# Patient Record
Sex: FEMALE | Employment: UNEMPLOYED | URBAN - METROPOLITAN AREA
[De-identification: names, ages, dates, MRNs, and addresses within clinical notes are randomized per-mention and may not be internally consistent; named-entity substitution may affect disease eponyms.]

---

## 2017-05-11 ENCOUNTER — HOSPITAL ENCOUNTER (EMERGENCY)
Facility: CLINIC | Age: 37
Discharge: HOME | End: 2017-05-11
Attending: EMERGENCY MEDICINE | Admitting: EMERGENCY MEDICINE

## 2017-05-11 VITALS
OXYGEN SATURATION: 99 % | TEMPERATURE: 99.3 F | DIASTOLIC BLOOD PRESSURE: 65 MMHG | SYSTOLIC BLOOD PRESSURE: 111 MMHG | RESPIRATION RATE: 16 BRPM | HEART RATE: 80 BPM | WEIGHT: 207.23 LBS

## 2017-05-11 DIAGNOSIS — R20.9 ALTERATIONS OF SENSATIONS: ICD-10-CM

## 2017-05-11 LAB
ALBUMIN UR-MCNC: NEGATIVE MG/DL
ANION GAP SERPL CALCULATED.3IONS-SCNC: 5 MMOL/L (ref 3–14)
APPEARANCE UR: CLEAR
BASOPHILS # BLD AUTO: 0 10E9/L (ref 0–0.2)
BASOPHILS NFR BLD AUTO: 0.5 %
BILIRUB UR QL STRIP: NEGATIVE
BUN SERPL-MCNC: 13 MG/DL (ref 7–30)
CALCIUM SERPL-MCNC: 8.9 MG/DL (ref 8.5–10.1)
CHLORIDE SERPL-SCNC: 106 MMOL/L (ref 94–109)
CO2 SERPL-SCNC: 29 MMOL/L (ref 20–32)
COLOR UR AUTO: YELLOW
CREAT SERPL-MCNC: 0.6 MG/DL (ref 0.52–1.04)
DIFFERENTIAL METHOD BLD: ABNORMAL
EOSINOPHIL # BLD AUTO: 0.2 10E9/L (ref 0–0.7)
EOSINOPHIL NFR BLD AUTO: 2.5 %
ERYTHROCYTE [DISTWIDTH] IN BLOOD BY AUTOMATED COUNT: 13 % (ref 10–15)
GFR SERPL CREATININE-BSD FRML MDRD: NORMAL ML/MIN/1.7M2
GLUCOSE BLDC GLUCOMTR-MCNC: 70 MG/DL (ref 70–99)
GLUCOSE BLDC GLUCOMTR-MCNC: 76 MG/DL (ref 70–99)
GLUCOSE SERPL-MCNC: 75 MG/DL (ref 70–99)
GLUCOSE UR STRIP-MCNC: NEGATIVE MG/DL
HCG UR QL: NEGATIVE
HCT VFR BLD AUTO: 34.5 % (ref 35–47)
HGB BLD-MCNC: 11.3 G/DL (ref 11.7–15.7)
HGB UR QL STRIP: NEGATIVE
IMM GRANULOCYTES # BLD: 0 10E9/L (ref 0–0.4)
IMM GRANULOCYTES NFR BLD: 0.5 %
KETONES BLD-SCNC: 0.1 MMOL/L (ref 0–0.6)
KETONES UR STRIP-MCNC: NEGATIVE MG/DL
LEUKOCYTE ESTERASE UR QL STRIP: NEGATIVE
LYMPHOCYTES # BLD AUTO: 2 10E9/L (ref 0.8–5.3)
LYMPHOCYTES NFR BLD AUTO: 22.6 %
MAGNESIUM SERPL-MCNC: 1.8 MG/DL (ref 1.6–2.3)
MCH RBC QN AUTO: 29.3 PG (ref 26.5–33)
MCHC RBC AUTO-ENTMCNC: 32.8 G/DL (ref 31.5–36.5)
MCV RBC AUTO: 89 FL (ref 78–100)
MONOCYTES # BLD AUTO: 0.4 10E9/L (ref 0–1.3)
MONOCYTES NFR BLD AUTO: 5.1 %
MUCOUS THREADS #/AREA URNS LPF: PRESENT /LPF
NEUTROPHILS # BLD AUTO: 6 10E9/L (ref 1.6–8.3)
NEUTROPHILS NFR BLD AUTO: 68.8 %
NITRATE UR QL: NEGATIVE
NRBC # BLD AUTO: 0 10*3/UL
NRBC BLD AUTO-RTO: 0 /100
PH UR STRIP: 6 PH (ref 5–7)
PLATELET # BLD AUTO: 296 10E9/L (ref 150–450)
POTASSIUM SERPL-SCNC: 3.6 MMOL/L (ref 3.4–5.3)
RBC # BLD AUTO: 3.86 10E12/L (ref 3.8–5.2)
RBC #/AREA URNS AUTO: 1 /HPF (ref 0–2)
SODIUM SERPL-SCNC: 140 MMOL/L (ref 133–144)
SP GR UR STRIP: 1.02 (ref 1–1.03)
SQUAMOUS #/AREA URNS AUTO: 2 /HPF (ref 0–1)
TSH SERPL DL<=0.005 MIU/L-ACNC: 2.85 MU/L (ref 0.4–4)
URN SPEC COLLECT METH UR: ABNORMAL
UROBILINOGEN UR STRIP-MCNC: 2 MG/DL (ref 0–2)
WBC # BLD AUTO: 8.7 10E9/L (ref 4–11)
WBC #/AREA URNS AUTO: <1 /HPF (ref 0–2)

## 2017-05-11 PROCEDURE — 80048 BASIC METABOLIC PNL TOTAL CA: CPT | Performed by: EMERGENCY MEDICINE

## 2017-05-11 PROCEDURE — 83735 ASSAY OF MAGNESIUM: CPT | Performed by: EMERGENCY MEDICINE

## 2017-05-11 PROCEDURE — 00000146 ZZHCL STATISTIC GLUCOSE BY METER IP

## 2017-05-11 PROCEDURE — 81001 URINALYSIS AUTO W/SCOPE: CPT | Performed by: EMERGENCY MEDICINE

## 2017-05-11 PROCEDURE — 99283 EMERGENCY DEPT VISIT LOW MDM: CPT | Mod: 25

## 2017-05-11 PROCEDURE — 81025 URINE PREGNANCY TEST: CPT | Performed by: EMERGENCY MEDICINE

## 2017-05-11 PROCEDURE — 84443 ASSAY THYROID STIM HORMONE: CPT | Performed by: EMERGENCY MEDICINE

## 2017-05-11 PROCEDURE — 85025 COMPLETE CBC W/AUTO DIFF WBC: CPT | Performed by: EMERGENCY MEDICINE

## 2017-05-11 PROCEDURE — 25000128 H RX IP 250 OP 636: Performed by: EMERGENCY MEDICINE

## 2017-05-11 PROCEDURE — 96360 HYDRATION IV INFUSION INIT: CPT

## 2017-05-11 PROCEDURE — 82010 KETONE BODYS QUAN: CPT | Performed by: EMERGENCY MEDICINE

## 2017-05-11 RX ADMIN — SODIUM CHLORIDE 500 ML: 9 INJECTION, SOLUTION INTRAVENOUS at 19:48

## 2017-05-11 ASSESSMENT — ENCOUNTER SYMPTOMS
BACK PAIN: 0
NECK PAIN: 0
BLOOD IN STOOL: 0
DIARRHEA: 1
WEAKNESS: 0
DIZZINESS: 0
LIGHT-HEADEDNESS: 0
SHORTNESS OF BREATH: 0
VOMITING: 0
FEVER: 0
RHINORRHEA: 1
HEADACHES: 0
NUMBNESS: 0
NAUSEA: 0
ABDOMINAL PAIN: 0

## 2017-05-11 NOTE — ED AVS SNAPSHOT
[facility]    [address] [place] [address]    Phone:  [phone]    Fax:  [phone]                                       Sherie Watst   MRN: [id number]    Department:  [facility]   Date of Visit:  5/11/2017           Patient Information     Date Of Birth          1980        Your diagnoses for this visit were:     Alterations of sensations        You were seen by Kitty Perez MD.      Follow-up Information     Follow up with Other Clinic, Md In 3 days.    Specialty:  Clinic    Contact information:               Discharge Instructions       Please follow up closely with your regular physician. Please return to the ED if your symptoms worsen or if you develop new or concerning symptoms.         24 Hour Appointment Hotline       To make an appointment at any [facility], call [phone] ([phone]). If you don't have a family doctor or clinic, we will help you find one. [facility] clinics are conveniently located to serve the needs of you and your family.             Review of your medicines      Our records show that you are taking the medicines listed below. If these are incorrect, please call your family doctor or clinic.        Dose / Directions Last dose taken    GABAPENTIN (ONCE-DAILY) PO        Refills:  0        HumuLIN N  UNIT/ML injection   Quantity:  3 Month   Generic drug:  insulin NPH        Inject  Subcutaneous. 45 units before breakfast, 73 units before dinner   Refills:  2        LISINOPRIL PO        Refills:  0        METFORMIN HCL PO        Refills:  0        NovoLOG VIAL 100 UNITS/ML injection   Quantity:  3 Month   Generic drug:  insulin aspart        Inject  Subcutaneous See Admin Instructions. 40 units before breakfast, 40 units before lunch, 44 units before dinner   Refills:  2                Procedures and tests performed during your visit     Procedure/Test  Number of Times Performed    Basic metabolic panel (BMP) 1    CBC + differential 1    Glucose by meter 2    Glucose monitor nursing POCT 1    HCG qualitative urine 1    Ketone Beta-Hydroxybutyrate Quantitative 1    Magnesium 1    TSH with free T4 reflex 1    UA with Microscopic 1      Orders Needing Specimen Collection     None      Pending Results     No orders found from 5/9/2017 to 5/12/2017.            Pending Culture Results     No orders found from 5/9/2017 to 5/12/2017.            Pending Results Instructions     If you had any lab results that were not finalized at the time of your Discharge, you can call the ED Lab Result RN at [phone]. You will be contacted by this team for any positive Lab results or changes in treatment. The nurses are available 7 days a week from 10A to 6:30P.  You can leave a message 24 hours per day and they will return your call.        Test Results From Your Hospital Stay        5/11/2017  7:11 PM      Component Results     Component Value Ref Range & Units Status    Glucose 76 70 - 99 mg/dL Final         5/11/2017  8:02 PM      Component Results     Component Value Ref Range & Units Status    WBC 8.7 4.0 - 11.0 10e9/L Final    RBC Count 3.86 3.8 - 5.2 10e12/L Final    Hemoglobin 11.3 (L) 11.7 - 15.7 g/dL Final    Hematocrit 34.5 (L) 35.0 - 47.0 % Final    MCV 89 78 - 100 fl Final    MCH 29.3 26.5 - 33.0 pg Final    MCHC 32.8 31.5 - 36.5 g/dL Final    RDW 13.0 10.0 - 15.0 % Final    Platelet Count 296 150 - 450 10e9/L Final    Diff Method Automated Method  Final    % Neutrophils 68.8 % Final    % Lymphocytes 22.6 % Final    % Monocytes 5.1 % Final    % Eosinophils 2.5 % Final    % Basophils 0.5 % Final    % Immature Granulocytes 0.5 % Final    Nucleated RBCs 0 0 /100 Final    Absolute Neutrophil 6.0 1.6 - 8.3 10e9/L Final    Absolute Lymphocytes 2.0 0.8 - 5.3 10e9/L Final    Absolute Monocytes 0.4 0.0 - 1.3 10e9/L Final    Absolute Eosinophils 0.2 0.0 - 0.7 10e9/L Final     Absolute Basophils 0.0 0.0 - 0.2 10e9/L Final    Abs Immature Granulocytes 0.0 0 - 0.4 10e9/L Final    Absolute Nucleated RBC 0.0  Final         5/11/2017  8:21 PM      Component Results     Component Value Ref Range & Units Status    Sodium 140 133 - 144 mmol/L Final    Potassium 3.6 3.4 - 5.3 mmol/L Final    Chloride 106 94 - 109 mmol/L Final    Carbon Dioxide 29 20 - 32 mmol/L Final    Anion Gap 5 3 - 14 mmol/L Final    Glucose 75 70 - 99 mg/dL Final    Urea Nitrogen 13 7 - 30 mg/dL Final    Creatinine 0.60 0.52 - 1.04 mg/dL Final    GFR Estimate >90  Non  GFR Calc   >60 mL/min/1.7m2 Final    GFR Estimate If Black >90   GFR Calc   >60 mL/min/1.7m2 Final    Calcium 8.9 8.5 - 10.1 mg/dL Final         5/11/2017  8:53 PM      Component Results     Component Value Ref Range & Units Status    Color Urine Yellow  Final    Appearance Urine Clear  Final    Glucose Urine Negative NEG mg/dL Final    Bilirubin Urine Negative NEG Final    Ketones Urine Negative NEG mg/dL Final    Specific Gravity Urine 1.020 1.003 - 1.035 Final    Blood Urine Negative NEG Final    pH Urine 6.0 5.0 - 7.0 pH Final    Protein Albumin Urine Negative NEG mg/dL Final    Urobilinogen mg/dL 2.0 0.0 - 2.0 mg/dL Final    Nitrite Urine Negative NEG Final    Leukocyte Esterase Urine Negative NEG Final    Source Midstream Urine  Final    WBC Urine <1 0 - 2 /HPF Final    RBC Urine 1 0 - 2 /HPF Final    Squamous Epithelial /HPF Urine 2 (H) 0 - 1 /HPF Final    Mucous Urine Present (A) NEG /LPF Final         5/11/2017  7:59 PM      Component Results     Component Value Ref Range & Units Status    Ketone Quantitative 0.1 0.0 - 0.6 mmol/L Final         5/11/2017  8:26 PM      Component Results     Component Value Ref Range & Units Status    TSH 2.85 0.40 - 4.00 mU/L Final         5/11/2017  8:21 PM      Component Results     Component Value Ref Range & Units Status    Magnesium 1.8 1.6 - 2.3 mg/dL Final         5/11/2017  8:52 PM       Component Results     Component Value Ref Range & Units Status    HCG Qual Urine Negative NEG Final         5/11/2017  9:33 PM      Component Results     Component Value Ref Range & Units Status    Glucose 70 70 - 99 mg/dL Final                Clinical Quality Measure: Blood Pressure Screening     Your blood pressure was checked while you were in the emergency department today. The last reading we obtained was  BP: 105/55 . Please read the guidelines below about what these numbers mean and what you should do about them.  If your systolic blood pressure (the top number) is less than 120 and your diastolic blood pressure (the bottom number) is less than 80, then your blood pressure is normal. There is nothing more that you need to do about it.  If your systolic blood pressure (the top number) is 120-139 or your diastolic blood pressure (the bottom number) is 80-89, your blood pressure may be higher than it should be. You should have your blood pressure rechecked within a year by a primary care provider.  If your systolic blood pressure (the top number) is 140 or greater or your diastolic blood pressure (the bottom number) is 90 or greater, you may have high blood pressure. High blood pressure is treatable, but if left untreated over time it can put you at risk for heart attack, stroke, or kidney failure. You should have your blood pressure rechecked by a primary care provider within the next 4 weeks.  If your provider in the emergency department today gave you specific instructions to follow-up with your doctor or provider even sooner than that, you should follow that instruction and not wait for up to 4 weeks for your follow-up visit.        Thank you for choosing [facility]       Thank you for choosing [facility] for your care. Our goal is always to provide you with excellent care. Hearing back from our patients is one way we can continue to improve our services. Please take a few minutes to complete the written  "survey that you may receive in the mail after you visit with us. Thank you!        [employer] Information     [employer] lets you send messages to your doctor, view your test results, renew your prescriptions, schedule appointments and more. To sign up, go to [email or web address] . Click on \"Log in\" on the left side of the screen, which will take you to the Welcome page. Then click on \"Sign up Now\" on the right side of the page.     You will be asked to enter the access code listed below, as well as some personal information. Please follow the directions to create your username and password.     Your access code is: [email or web address]  Expires: 2017  9:31 PM     Your access code will  in 90 days. If you need help or a new code, please call your [facility] clinic or [phone].        Care EveryWhere ID     This is your Care EveryWhere ID. This could be used by other organizations to access your [facility] medical records  [id number]        After Visit Summary       This is your record. Keep this with you and show to your community pharmacist(s) and doctor(s) at your next visit.                  "

## 2017-05-11 NOTE — ED AVS SNAPSHOT
[facility] Emergency Department    [address]    [facility] [address]    Phone:  [phone]    Fax:  [phone]                                       Sherie Watts   MRN: [id number]    Department:  [facility] Emergency Department   Date of Visit:  5/11/2017           After Visit Summary Signature Page     I have received my discharge instructions, and my questions have been answered. I have discussed any challenges I see with this plan with the nurse or doctor.    ..........................................................................................................................................  Patient/Patient Representative Signature      ..........................................................................................................................................  Patient Representative Print Name and Relationship to Patient    ..................................................               ................................................  Date                                            Time    ..........................................................................................................................................  Reviewed by Signature/Title    ...................................................              ..............................................  Date                                                            Time

## 2017-05-12 NOTE — ED NOTES
"Pt has been having difficulty regulating her blood sugar. Pt is also having generalized feelings of numbness \"pins and needles\".   "

## 2017-05-12 NOTE — DISCHARGE INSTRUCTIONS
Please follow up closely with your regular physician. Please return to the ED if your symptoms worsen or if you develop new or concerning symptoms.

## 2017-05-25 ENCOUNTER — HOSPITAL ENCOUNTER (EMERGENCY)
Facility: CLINIC | Age: 37
Discharge: HOME | End: 2017-05-25
Attending: PHYSICIAN ASSISTANT | Admitting: PHYSICIAN ASSISTANT

## 2017-05-25 VITALS
SYSTOLIC BLOOD PRESSURE: 119 MMHG | RESPIRATION RATE: 18 BRPM | TEMPERATURE: 97 F | DIASTOLIC BLOOD PRESSURE: 68 MMHG | OXYGEN SATURATION: 98 % | HEART RATE: 74 BPM

## 2017-05-25 DIAGNOSIS — N39.0 ACUTE UTI: ICD-10-CM

## 2017-05-25 LAB
ALBUMIN UR-MCNC: 100 MG/DL
APPEARANCE UR: ABNORMAL
BACTERIA #/AREA URNS HPF: ABNORMAL /HPF
BILIRUB UR QL STRIP: NEGATIVE
COLOR UR AUTO: YELLOW
GLUCOSE UR STRIP-MCNC: >499 MG/DL
HCG UR QL: NEGATIVE
HGB UR QL STRIP: ABNORMAL
KETONES UR STRIP-MCNC: 20 MG/DL
LEUKOCYTE ESTERASE UR QL STRIP: ABNORMAL
MUCOUS THREADS #/AREA URNS LPF: PRESENT /LPF
NITRATE UR QL: NEGATIVE
PH UR STRIP: 5 PH (ref 5–7)
RBC #/AREA URNS AUTO: >182 /HPF (ref 0–2)
SP GR UR STRIP: 1.03 (ref 1–1.03)
SQUAMOUS #/AREA URNS AUTO: 9 /HPF (ref 0–1)
URN SPEC COLLECT METH UR: ABNORMAL
UROBILINOGEN UR STRIP-MCNC: 0 MG/DL (ref 0–2)
WBC #/AREA URNS AUTO: >182 /HPF (ref 0–2)

## 2017-05-25 PROCEDURE — 81025 URINE PREGNANCY TEST: CPT | Performed by: EMERGENCY MEDICINE

## 2017-05-25 PROCEDURE — 81001 URINALYSIS AUTO W/SCOPE: CPT | Performed by: EMERGENCY MEDICINE

## 2017-05-25 PROCEDURE — 99283 EMERGENCY DEPT VISIT LOW MDM: CPT

## 2017-05-25 RX ORDER — PHENAZOPYRIDINE HYDROCHLORIDE 200 MG/1
200 TABLET, FILM COATED ORAL 3 TIMES DAILY
Qty: 9 TABLET | Refills: 0 | Status: SHIPPED | OUTPATIENT
Start: 2017-05-25 | End: 2017-05-28

## 2017-05-25 RX ORDER — CEPHALEXIN 500 MG/1
500 CAPSULE ORAL 2 TIMES DAILY
Qty: 14 CAPSULE | Refills: 0 | Status: SHIPPED | OUTPATIENT
Start: 2017-05-25 | End: 2017-06-01

## 2017-05-25 ASSESSMENT — ENCOUNTER SYMPTOMS
FEVER: 0
DIFFICULTY URINATING: 1
NAUSEA: 0
FREQUENCY: 1
VOMITING: 0
DYSURIA: 1
ABDOMINAL PAIN: 0

## 2017-05-25 NOTE — LETTER
[facility] EMERGENCY DEPARTMENT  [address] [facility] [address]  [phone]      May 25, 2017      To Whom it may concern:    _________________________ was in our Emergency Department today, May 25, 2017, with a patient who needed their assistance.  Please excuse them from work/school.      Sincerely,    Kerry French RN

## 2017-05-25 NOTE — ED AVS SNAPSHOT
[facility] Emergency Department    [address]    [facility] [address]    Phone:  [phone]    Fax:  [phone]                                       Sherie Palma Texta   MRN: [id number]    Department:  [facility] Emergency Department   Date of Visit:  5/25/2017           Patient Information     Date Of Birth          1980        Your diagnoses for this visit were:     Acute UTI        You were seen by Rosalee Gil PA-C.      Follow-up Information     Follow up with [facility] In 2 days.    Specialties:  Sports Medicine, Pain Management, Obstetrics & Gynecology, Pediatrics, Internal Medicine, Nephrology    Why:  As needed    Contact information:    [address] [place] [address]  [facility] [address] [phone]        Follow up with [facility] Emergency Department.    Specialty:  EMERGENCY MEDICINE    Why:  If symptoms worsen    Contact information:    [address]  [facility] [id number] [phone]        Discharge Instructions         Infecciones de las vías urinarias en las mujeres  Las infecciones de las vías urinarias suelen ocurrir a causa de bacterias que invaden las vías urinarias, nadia sea desde fuera del cuerpo o desplazándose desde el recto o la vagina hasta el interior de la uretra. La anatomía de la allie hace más fácil la entrada de bacterias en las vías urinarias, por lo que estas infecciones son más frecuentes en las mujeres que en los hombres. El síntoma más común de la mayoría de estas infecciones es el dolor en las vías urinarias, yves la única manera de saber con certeza si hay brandt infección es hacer un cultivo de orina (urocultivo).    Javier tipos de infecciones de las vías urinarias    Cistitis: La infección de la vejiga (cistitis) es la forma de infección urinaria más común en las mujeres. Puede provocar un deseo urgente o frecuente de orinar, así satnam  fiebre, dolor, escozor al orinar y raine en la orina.    Uretritis: La uretritis consiste en la inflamación de la uretra. Puede causar dolor en la parte inferior del vientre o de la espalda, así satnam un deseo urgente o frecuente de orinar.    Pielonefritis: La pielonefritis es brandt infección de los riñones que puede causar daños graves a los mismos si se cristian sin tratar. En los casos más graves puede requerir hospitalización. Los síntomas pueden consistir en fiebre y dolor en la parte superior de la espalda.  Medicamentos para tratar brandt infección de las vías urinarias  La mayoría de las infecciones de las vías urinarias se tratan con antibióticos para eliminar las bacterias. La duración del tratamiento con antibióticos dependerá del tipo de infección que tenga, y puede ser de solo 3 días. Si tiene infecciones urinarias frecuentes, podría ser necesario un tratamiento antibiótico con dosis reducida leslye varios meses. [place] los antibióticos hasta terminarlos y siga exactamente las indicaciones que le hayan dado. De lo contrario la infección puede persistir y hacerse más difícil de tratar.  Cambios del estilo de galilea para tratar y prevenir las infecciones urinarias  Los cambios de estilo de galilea descritos a continuación pueden ayudarle a combatir leal infección actual de las vías urinarias y a prevenir nuevas infecciones en el futuro:    Anabelle abundante líquido, satnam agua, jugo de fruta u otras bebidas sin cafeína. Elsberry ayuda a eliminar las bacterias. El jugo (o los comprimidos) de arándano dill puede ayudar a prevenir la reaparición de la infección.    Vacíe la vejiga. Siempre vacíe la vejiga cuando sienta deseo de hacerlo y antes de acostarse. La orina que permanece en la vejiga promueve la infección. Trate de orinar antes y después de tener relaciones sexuales.    Practique brandt buena higiene personal. Límpiese nadia con un movimiento desde adelante hacia atrás después de usar el inodoro. Elsberry ayuda aevitar que las  bacterias entren a la uretra.    Utilice condones leslye el sexo. Placitas ayuda a prevenir las infecciones en las vías urinarias provocadas por bacterias transmitidas sexualmente. Además, evite el uso de espermicidas en las relaciones sexuales, ya que pueden aumentar el riesgo de infecciones. Elija otra forma de control de la natalidad. Las mujeres que tienden a desarrollar infecciones en las vías urinarias después del sexo podrían utilizar un antibiótico preventivo de dosis baja. Asegúrese de discutir estas opciones con leal proveedor de atención médica.    Vaya a todas las visitas de control que le recomienden con leal proveedor de atención médica para que revise si las bacterias que causan la infección arroyo sido eliminadas, y para que le dé tratamiento si los problemas reaparecen.    [id number] [place] [address]. Todos los derechos reservados. Esta información no pretende sustituir la atención médica profesional. Sólo leal médico puede diagnosticar y tratar un problema de neo.      24 Hour Appointment Hotline       To make an appointment at any [place] clinic, call [phone] ([phone]). If you don't have a family doctor or clinic, we will help you find one. [place] clinics are conveniently located to serve the needs of you and your family.             Review of your medicines      START taking        Dose / Directions Last dose taken    cephALEXin 500 MG capsule   Commonly known as:  KEFLEX   Dose:  500 mg   Quantity:  14 capsule        Take 1 capsule (500 mg) by mouth 2 times daily for 7 days   Refills:  0        phenazopyridine 200 MG tablet   Commonly known as:  PYRIDIUM   Dose:  200 mg   Quantity:  9 tablet        Take 1 tablet (200 mg) by mouth 3 times daily for 3 days   Refills:  0          Our records show that you are taking the medicines listed below. If these are incorrect, please call your family doctor or clinic.        Dose / Directions Last dose taken     GABAPENTIN (ONCE-DAILY) PO        Refills:  0        HumuLIN N  UNIT/ML injection   Quantity:  3 Month   Generic drug:  insulin NPH        Inject  Subcutaneous. 45 units before breakfast, 73 units before dinner   Refills:  2        LISINOPRIL PO        Refills:  0        METFORMIN HCL PO        Refills:  0        NovoLOG VIAL 100 UNITS/ML injection   Quantity:  3 Month   Generic drug:  insulin aspart        Inject  Subcutaneous See Admin Instructions. 40 units before breakfast, 40 units before lunch, 44 units before dinner   Refills:  2                Prescriptions were sent or printed at these locations (2 Prescriptions)                   Other Prescriptions                Printed at Department/Unit printer (2 of 2)         cephALEXin (KEFLEX) 500 MG capsule               phenazopyridine (PYRIDIUM) 200 MG tablet                Procedures and tests performed during your visit     HCG qualitative urine    UA with Microscopic      Orders Needing Specimen Collection     None      Pending Results     No orders found from 5/23/2017 to 5/26/2017.            Pending Culture Results     No orders found from 5/23/2017 to 5/26/2017.            Pending Results Instructions     If you had any lab results that were not finalized at the time of your Discharge, you can call the ED Lab Result RN at [phone]. You will be contacted by this team for any positive Lab results or changes in treatment. The nurses are available 7 days a week from 10A to 6:30P.  You can leave a message 24 hours per day and they will return your call.        Test Results From Your Hospital Stay        5/25/2017  7:07 PM      Component Results     Component Value Ref Range & Units Status    Color Urine Yellow  Final    Appearance Urine Cloudy  Final    Glucose Urine >499 (A) NEG mg/dL Final    Bilirubin Urine Negative NEG Final    Ketones Urine 20 (A) NEG mg/dL Final    Specific Gravity Urine 1.030 1.003 - 1.035 Final    Blood Urine Large (A) NEG  Final    pH Urine 5.0 5.0 - 7.0 pH Final    Protein Albumin Urine 100 (A) NEG mg/dL Final    Urobilinogen mg/dL 0.0 0.0 - 2.0 mg/dL Final    Nitrite Urine Negative NEG Final    Leukocyte Esterase Urine Moderate (A) NEG Final    Source Midstream Urine  Final    WBC Urine >182 (H) 0 - 2 /HPF Final    RBC Urine >182 (H) 0 - 2 /HPF Final    Bacteria Urine Moderate (A) NEG /HPF Final    Squamous Epithelial /HPF Urine 9 (H) 0 - 1 /HPF Final    Mucous Urine Present (A) NEG /LPF Final         5/25/2017  7:02 PM      Component Results     Component Value Ref Range & Units Status    HCG Qual Urine Negative NEG Final                Clinical Quality Measure: Blood Pressure Screening     Your blood pressure was checked while you were in the emergency department today. The last reading we obtained was  BP: 139/76 . Please read the guidelines below about what these numbers mean and what you should do about them.  If your systolic blood pressure (the top number) is less than 120 and your diastolic blood pressure (the bottom number) is less than 80, then your blood pressure is normal. There is nothing more that you need to do about it.  If your systolic blood pressure (the top number) is 120-139 or your diastolic blood pressure (the bottom number) is 80-89, your blood pressure may be higher than it should be. You should have your blood pressure rechecked within a year by a primary care provider.  If your systolic blood pressure (the top number) is 140 or greater or your diastolic blood pressure (the bottom number) is 90 or greater, you may have high blood pressure. High blood pressure is treatable, but if left untreated over time it can put you at risk for heart attack, stroke, or kidney failure. You should have your blood pressure rechecked by a primary care provider within the next 4 weeks.  If your provider in the emergency department today gave you specific instructions to follow-up with your doctor or provider even sooner than  "that, you should follow that instruction and not wait for up to 4 weeks for your follow-up visit.        Thank you for choosing [facility]       Thank you for choosing [facility] for your care. Our goal is always to provide you with excellent care. Hearing back from our patients is one way we can continue to improve our services. Please take a few minutes to complete the written survey that you may receive in the mail after you visit with us. Thank you!        [employer] Information     [employer] lets you send messages to your doctor, view your test results, renew your prescriptions, schedule appointments and more. To sign up, go to [email or web address] . Click on \"Log in\" on the left side of the screen, which will take you to the Welcome page. Then click on \"Sign up Now\" on the right side of the page.     You will be asked to enter the access code listed below, as well as some personal information. Please follow the directions to create your username and password.     Your access code is: [email or web address]  Expires: 2017  9:31 PM     Your access code will  in 90 days. If you need help or a new code, please call your [facility] clinic or [phone].        Care EveryWhere ID     This is your Care EveryWhere ID. This could be used by other organizations to access your [facility] medical records  [id number]        After Visit Summary       This is your record. Keep this with you and show to your community pharmacist(s) and doctor(s) at your next visit.                  "

## 2017-05-25 NOTE — ED NOTES
"Pt arrives to ED with bleeding she reports pain with urination and has only been able to void small amounts it started after pt and  were having sex they report \"it was rough\" Pain and bleeding started then. Pt a/o x 3, translation line used for triage.  "

## 2017-05-25 NOTE — ED AVS SNAPSHOT
[facility] Emergency Department    [address]    [facility] [address]    Phone:  [phone]    Fax:  [phone]                                       Sherie Watts   MRN: [id number]    Department:  [facility] Emergency Department   Date of Visit:  5/25/2017           After Visit Summary Signature Page     I have received my discharge instructions, and my questions have been answered. I have discussed any challenges I see with this plan with the nurse or doctor.    ..........................................................................................................................................  Patient/Patient Representative Signature      ..........................................................................................................................................  Patient Representative Print Name and Relationship to Patient    ..................................................               ................................................  Date                                            Time    ..........................................................................................................................................  Reviewed by Signature/Title    ...................................................              ..............................................  Date                                                            Time

## 2017-05-26 NOTE — DISCHARGE INSTRUCTIONS
Infecciones de las vías urinarias en las mujeres  Las infecciones de las vías urinarias suelen ocurrir a causa de bacterias que invaden las vías urinarias, ndaia sea desde fuera del cuerpo o desplazándose desde el recto o la vagina hasta el interior de la uretra. La anatomía de la allie hace más fácil la entrada de bacterias en las vías urinarias, por lo que estas infecciones son más frecuentes en las mujeres que en los hombres. El síntoma más común de la mayoría de estas infecciones es el dolor en las vías urinarias, yves la única manera de saber con certeza si hay bradnt infección es hacer un cultivo de orina (urocultivo).    Javier tipos de infecciones de las vías urinarias    Cistitis: La infección de la vejiga (cistitis) es la forma de infección urinaria más común en las mujeres. Puede provocar un deseo urgente o frecuente de orinar, así satnam fiebre, dolor, escozor al orinar y raine en la orina.    Uretritis: La uretritis consiste en la inflamación de la uretra. Puede causar dolor en la parte inferior del vientre o de la espalda, así satnam un deseo urgente o frecuente de orinar.    Pielonefritis: La pielonefritis es brandt infección de los riñones que puede causar daños graves a los mismos si se cristian sin tratar. En los casos más graves puede requerir hospitalización. Los síntomas pueden consistir en fiebre y dolor en la parte superior de la espalda.  Medicamentos para tratar brandt infección de las vías urinarias  La mayoría de las infecciones de las vías urinarias se tratan con antibióticos para eliminar las bacterias. La duración del tratamiento con antibióticos dependerá del tipo de infección que tenga, y puede ser de solo 3 días. Si tiene infecciones urinarias frecuentes, podría ser necesario un tratamiento antibiótico con dosis reducida leslye varios meses. [place] los antibióticos hasta terminarlos y siga exactamente las indicaciones que le hayan dado. De lo contrario la infección puede persistir y hacerse más  difícil de tratar.  Cambios del estilo de galilea para tratar y prevenir las infecciones urinarias  Los cambios de estilo de galilea descritos a continuación pueden ayudarle a combatir leal infección actual de las vías urinarias y a prevenir nuevas infecciones en el futuro:    Anabelle abundante líquido, satnam agua, jugo de fruta u otras bebidas sin cafeína. [place] ayuda a eliminar las bacterias. El jugo (o los comprimidos) de arándano dill puede ayudar a prevenir la reaparición de la infección.    Vacíe la vejiga. Siempre vacíe la vejiga cuando sienta deseo de hacerlo y antes de acostarse. La orina que permanece en la vejiga promueve la infección. Trate de orinar antes y después de tener relaciones sexuales.    Practique brandt buena higiene personal. Límpiese nadia con un movimiento desde adelante hacia atrás después de usar el inodoro. [place] ayuda aevitar que las bacterias entren a la uretra.    Utilice condones leslye el sexo. [place] ayuda a prevenir las infecciones en las vías urinarias provocadas por bacterias transmitidas sexualmente. Además, evite el uso de espermicidas en las relaciones sexuales, ya que pueden aumentar el riesgo de infecciones. Elija otra forma de control de la natalidad. Las mujeres que tienden a desarrollar infecciones en las vías urinarias después del sexo podrían utilizar un antibiótico preventivo de dosis baja. Asegúrese de discutir estas opciones con leal proveedor de atención médica.    Vaya a todas las visitas de control que le recomienden con leal proveedor de atención médica para que revise si las bacterias que causan la infección arroyo sido eliminadas, y para que le dé tratamiento si los problemas reaparecen.    [id number] Zonia Chapman, [address] [place] [address], [place] [address]. Todos los derechos reservados. Esta información no pretende sustituir la atención médica profesional. Sólo leal médico puede diagnosticar y tratar un problema de neo.

## 2017-05-26 NOTE — ED PROVIDER NOTES
History     Chief Complaint:  Dysuria    HPI History obtained through interpretor phone.  Sherie Watts is a 36 year old female who presents to the emergency department today for evaluation of dysuria. The patient states that she has pain when urinating and has had some difficulty urinating. Patient claims that today s symptoms are similar to past urinary tract infections. Furthermore, the patient notes that she had abnormal bleeding last night after intercourse, but has stopped since. Her last period was about a month ago and she expects her next period within a few days and is not on any form of birth control. She denies nausea, vomiting, and fever. The patient also denies any stomach or back pain.    Allergies:  No Known Drug Allergies    Medications:    Metformin  Gabapentin  Lisinopril  Insulin     Past Medical History:    Diabetes mellitus  Depressive disorder    Past Surgical History:    History reviewed. No pertinent past surgical history.    Family History:    History reviewed. No pertinent family history.     Social History:  The patient was accompanied to the ED by her .  Smoking Status: Negative  Alcohol Use: Negative  Marital Status:   [2]    Review of Systems   Constitutional: Negative for fever.   Gastrointestinal: Negative for abdominal pain, nausea and vomiting.   Genitourinary: Positive for difficulty urinating, dysuria, frequency and vaginal bleeding. Negative for menstrual problem and vaginal discharge.   All other systems reviewed and are negative.    Physical Exam   Vitals:  Patient Vitals for the past 24 hrs:   BP Temp Temp src Pulse Resp SpO2   05/25/17 1937 119/68 - - - - 98 %   05/25/17 1915 - - - - - 98 %   05/25/17 1907 - - - - - 96 %   05/25/17 1906 139/76 - - - - -   05/25/17 1656 - 97  F (36.1  C) Temporal 74 18 97 %   05/25/17 1655 (!) 167/117 - - - - -     Physical Exam  Constitutional: Alert, attentive  HENT:    Nose: Nose normal.    Mouth/Throat:  Oropharynx is clear, mucous membranes are moist   Eyes: EOM are normal. Pupils are equal, round, and reactive to light.   CV: regular rate and rhythm  Chest: Effort normal and breath sounds normal.   GI:  There is no tenderness. No distension. Normal bowel sounds  MSK: Normal range of motion. No CVA tenderness.   Neurological: Alert, attentive  Skin: Skin is warm and dry.     Emergency Department Course     Laboratory:  Laboratory findings were communicated with the patient who voiced understanding of the findings.    UA: GLC: >499 (A), Ketones: 20 (A), Blood: Large (A), Protein Albumin: 100 (A), LE: moderate (A), RBC/HPF: 182 (H), WBC.HPF: >182 (H), Bacteria: Moderate (A), Squamous cells: 9 (H), Mucous Present (A)    Emergency Department Course:  Nursing notes and vitals reviewed.  I performed an exam of the patient as documented above.   The patient provided a urine sample here in the emergency department. This was sent for laboratory testing, findings above.  At 1911 the patient was rechecked and was updated on the results of her laboratory studies.   I discussed the treatment plan with the patient. She expressed understanding of this plan and consented to discharge. She will be discharged home with instructions for care and follow up. In addition, the patient will return to the emergency department if their symptoms persist, worsen, if new symptoms arise or if there is any concern.  All questions were answered.  I personally reviewed the laboratory results with the patient and answered all related questions prior to discharge.    Impression & Plan      Medical Decision Making:  hSerie Watts is a 36 year old female who presents for evaluation of dysuria.  This clinically is consistent with a urinary tract infection.  Urinalysis confirms the infection.  There has been no fever, back/flank pain or significant abdominal pain.  There is no clinical evidence of pyelonephritis, appendicitis, colitis,  diverticulitis or any intraabdominal catastrophe. The patient will be started on antibiotics for the infection. Return if increasing pain, vomiting, fever, or inability to tolerate the oral antibiotic and prescribed pyridium for discomfort.  Follow up with primary physician is indicated if not improving in 2-3 days.     Diagnosis:    ICD-10-CM    1. Acute UTI N39.0        Discharge Medications:  New Prescriptions    CEPHALEXIN (KEFLEX) 500 MG CAPSULE    Take 1 capsule (500 mg) by mouth 2 times daily for 7 days    PHENAZOPYRIDINE (PYRIDIUM) 200 MG TABLET    Take 1 tablet (200 mg) by mouth 3 times daily for 3 days       Scribe Disclosure:  eLonel IBARRA, am serving as a scribe at 7:42 PM on 5/25/2017 to document services personally performed by Rosalee Gil MD, based on my observations and the provider's statements to me.    5/25/2017   [facility] EMERGENCY DEPARTMENT       Rosalee Gil PA-C  05/25/17 205

## 2023-07-12 NOTE — ED PROVIDER NOTES
"  History     Chief Complaint:  Hyperglycemia and Tingling     HPI   Sherie Watts is a 36 year old female with a history of insulin dependent diabetes who presents with concern for hyperglycemia and intermittent tingling. The patient reports that she checked her blood sugar this evening and it was at 174 but she \"felt like it was at 40\". She reports that she has had intermittent tingling in her hands and feet for the last 3 days and is concerned that something is wrong with her blood sugars, prompting her to come to the ED for evaluation. On arrival to the ED, the patient reports she gets tingling in her hands and feet whenever she crosses her legs, bends down, or writes or bends her arms. The tingling resolves when she straightens her hands and legs. The patient reports one episode of diarrhea today and states she has had a cold recently. She denies any fever, headache, abdominal pain, chest pain, shortness of breath, numbness, weakness, or gait problems. No hematuria or dysuria. The patient denies any recent changes to her insulin. She checks her sugars 1 hour after she wakes up, 1 hour before each meal, and 2 hours after each meal. No other concerns or complaints at this time.     Allergies:  No Known Allergies     Medications:    GABAPENTIN, ONCE-DAILY, PO  METFORMIN HCL PO  LISINOPRIL PO  insulin aspart (NOVOLOG) 100 UNIT/ML injection  insulin NPH (HUMULIN N PEN) 100 UNIT/ML injection    Past Medical History:    Diabetes mellitus  Depression     Past Surgical History:    History reviewed. No pertinent surgical history.    Family History:    History reviewed. No pertinent family history.     Social History:  Smoking status: No  Alcohol use: No  Presents to the ED with her children  Marital Status:   [2]     Review of Systems   Constitutional: Negative for fever.   HENT: Positive for congestion and rhinorrhea.    Eyes: Negative for visual disturbance.   Respiratory: Negative for shortness of " HOME INSTRUCTION SHEET  Procedure/Condition: Cataract  Discharge Medications:   Regular Tylenol as directed on the bottle if needed.  Begin eye drops today at 12:00pm, 4:00pm and 8:00pm.  Use one drop of each the Moxifloxacin/Vigamox and Prednisolone at each of these times today.     Starting the day after surgery you will use the drops as follows:  1. Vigamox (moxifloxacin) one drop 3 times per day (breakfast, mid afternoon and bedtime) until gone. This should last about 2 weeks.  2. Prednisolone (or Durezol) one drop 3 times per day (breakfast, mid afternoon and bedtime) until gone. This should last about 3-4 weeks.  3. Prolensa one drop daily until gone (at least 2 weeks).    Wait 5 minutes between each drop. May use drops in any order.       Do not rub your eye or touch your eye with the bottle tip.  Please shake the Prednisolone before use.    Activity:   1.     Rest today.  Do not do any heavy lifting or strenuous activity for one week.   2.     Do not work or be in a bisi or dirty place for one week.   3.     No swimming or hot tub use for one month.    4.     Do not drive a motor vehicle or operate machinery or appliances, make any                       important  decisions or drink alcohol for the next 24 hours.     5.     If there are any activities that you are unsure about check with your doctor.   6.     A responsible adult needs to oversee your care today.  Diet:  You may eat your usual diet.   Dressing/Incision:  Remove eye shield in four hours:  Wear your glasses during the day and your eye shield tonight.  Special Instructions:  DO NOT get your eye wet for one week.  You can bathe and shampoo your hair, but do not get water in your eye.     3.   Dark glasses are for comfort only (if light bothers your eye). Wear them as         needed.  4.   If you use glaucoma drops continue to use them, but do not use any over the        counter eye drops.   5. No eye makeup for one week after surgery.  6. It is  breath.    Cardiovascular: Negative for chest pain.   Gastrointestinal: Positive for diarrhea. Negative for abdominal pain, blood in stool, nausea and vomiting.   Musculoskeletal: Negative for back pain, gait problem and neck pain.   Skin: Negative for rash.   Neurological: Negative for dizziness, syncope, weakness, light-headedness, numbness and headaches.        Tingling    All other systems reviewed and are negative.      Physical Exam     Patient Vitals for the past 24 hrs:   BP Temp Temp src Pulse Resp SpO2 Weight   05/11/17 2100 105/55 - - - - 98 % -   05/11/17 1901 (!) 152/93 99.3  F (37.4  C) Oral 102 18 98 % 94 kg (207 lb 3.7 oz)       Physical Exam  Constitutional: The patient is oriented to person, place, and time. Alert and cooperative.  HENT:   Right Ear: External ear normal.   Left Ear: External ear normal.   Nose: Nose normal.   Mouth/Throat: Uvula is midline, oropharynx is clear and moist and mucous membranes are normal. No posterior oropharyngeal edema or erythema.   Eyes: Conjunctivae, EOM and lids are normal. Pupils are equal, round, and reactive to light.   Neck: Trachea normal. Normal range of motion. Neck supple.   Cardiovascular: tachycardia, regular rhythm, normal heart sounds, and intact distal pulses.    Pulmonary/Chest: Effort normal and breath sounds equal bilaterally. No crackles or wheezing.   Abdominal: Soft. No tenderness. No rebound and no guarding.   Musculoskeletal: Normal range of motion.  No extremity tenderness or edema.  Neuro : Alert and oriented x 3. Cranial nerves 2-12 grossly intact. No facial asymmetry, dysarthria, or aphasia. Strength 5/5 in upper and lower extremities bilaterally. Sensation intact to light touch throughout. Patellar reflex 1+.  Skin: Skin is dry. No rash noted.          Emergency Department Course   Laboratory:  CBC: HGB 11.3(L), o/w WNL (WBC 8.7, )   BMP: WNL (Creatinine 0.60)  TSH: 2.85  Magnesium: 1.8  Ketone quant: 2.85    Glucose by meter  normal for your eye to feel scratchy, burn and vision may be blurry today. You may put a cool washcloth around the eye to help with discomfort, itching or burning.  7. GOOD HANDWASHING PRIOR TO INSTILLING YOUR EYE DROPS.   Call Your Doctor if You Have Any of The Followin.     Excessive pain not controlled by pain medication.   2.     Decreased vision, eyes are dry and scratchy.   3.     Temperature above 101 degrees.   You will next see your physician at their office.  Call for an appointment if needed at the number listed below.  The [facility] staff will call you in the next week  to see how you are doing.  If you need to speak to your Doctor, call his/her office number as listed below.  You can also feel free to contact the surgery center between the hours of 6:00 AM and 4:30 PM Monday-Thursday at [phone] with your questions/concerns regarding your instructions.    Physician:  Iftikhar Duarte M.D.  Office Address:  [address] [place] [address]  Office Phone: [phone]        The following eye drops(s)Vigamox, Prolensa and Prednisolone were received by __________________________________ (responsible parties signature) prior to patient's discharge.     Patient and significant other have verbalized  understanding of these instructions and have  received a copy.       It has been our privilege to take care of you today.  Our goal has been to make sure you and your family always felt confident in your care while you were here today. If at any time after you leave that questions, concerns, or worries should arise do not hesitate to contact us. It is always important that we treat you with the courtesy and respect that you deserve.  Thank you for choosing [facility] for you your care!    (1904): 76  Glucose by meter (2126): 70    UA: Squamous epithelial 2(H), Mucous present, o/w negative  HCG qual: Negative    Interventions:  NS 1L IV Bolus    Emergency Department Course:  Past medical records, nursing notes, and vitals reviewed.  1924: I performed an exam of the patient and obtained history, as documented above.  IV inserted and blood drawn.    2107: I rechecked the patient. Explained findings to the patient.  Patient given some juice. The patient passed a PO challenge prior to discharge from the ED.     I rechecked the patient.  Findings and plan explained to the Patient. Patient discharged home with instructions regarding supportive care, medications, and reasons to return. The importance of close follow-up was reviewed.     Impression & Plan      Medical Decision Making:  Sherie Watts is a 36 year old female with a history of depression and diabetes who presents to the Emergency Department for evaluation of hyperglycemia and intermittent tingling sensation to the hands and feet bilaterally. Upon presentation in the ED, patient is non-toxic appearing. She is mildly hypertensive and tachycardic, but vitals are otherwise within normal limits and stable. Throughout her ED stay, her heart rate and blood pressure did improve to within normal limits. On exam, she is well appearing. She is alert and oriented x3, cranial nerves 2-12 are grossly intact. Strength is 5/5 in the upper and lower extremities bilaterally. Sensation is intact to light touch throughout. Cardiopulmonary exam is unremarkable. Abdomen is soft and nontender throughout. The rest of her exam is as mentioned above. Labs were obtained and are as mentioned above. Notably, she does not have a leukocytosis. Blood glucose is within normal limits at 75. Overall, the patient's lab evaluation here is relatively unremarkable. She has no signs of DKA. Although she does endorse tingling of the hands and feet bilaterally, she notes  that this is intermittent. Given the bilateral nature of her symptoms, this is not consistent with an acute CVA or TIA. She has no red flag signs or symptoms to suggest an acute intracranial process such as hemorrhage or mass. Therefore, I do not feel that imaging of the head is indicated at this time. Her history and presentation is not consistent with Guillain-Barré. She denies any chest or abdominal pain. Overall, given that the patient is well appearing and with an unremarkable work up, I do feel that she can be discharged to home. I did discuss with the patient that I recommend very close follow up with a primary care physician and she notes understanding and agreement. Return instructions were given. She was stable/improved at the time of discharge.    Diagnosis:    ICD-10-CM   1. Alterations of sensations R20.9     Disposition: Discharged to home    Lisa Chacon  5/11/2017   [facility] EMERGENCY DEPARTMENT    Lisa IBARRA am serving as a scribe at 7:24 PM on 5/11/2017 to document services personally performed by Kitty Perez MD based on my observations and the provider's statements to me.        Kitty Perez MD  05/12/17 [id number]